# Patient Record
Sex: FEMALE | Race: BLACK OR AFRICAN AMERICAN | Employment: UNEMPLOYED | URBAN - METROPOLITAN AREA
[De-identification: names, ages, dates, MRNs, and addresses within clinical notes are randomized per-mention and may not be internally consistent; named-entity substitution may affect disease eponyms.]

---

## 2017-08-03 ENCOUNTER — OFFICE VISIT (OUTPATIENT)
Dept: URGENT CARE | Facility: URGENT CARE | Age: 27
End: 2017-08-03
Payer: COMMERCIAL

## 2017-08-03 VITALS
TEMPERATURE: 97.7 F | OXYGEN SATURATION: 97 % | BODY MASS INDEX: 41.54 KG/M2 | WEIGHT: 242 LBS | SYSTOLIC BLOOD PRESSURE: 118 MMHG | HEART RATE: 93 BPM | DIASTOLIC BLOOD PRESSURE: 50 MMHG

## 2017-08-03 DIAGNOSIS — H66.001 ACUTE SUPPURATIVE OTITIS MEDIA OF RIGHT EAR WITHOUT SPONTANEOUS RUPTURE OF TYMPANIC MEMBRANE, RECURRENCE NOT SPECIFIED: Primary | ICD-10-CM

## 2017-08-03 PROCEDURE — 99203 OFFICE O/P NEW LOW 30 MIN: CPT | Performed by: PHYSICIAN ASSISTANT

## 2017-08-03 RX ORDER — AMOXICILLIN 875 MG
875 TABLET ORAL 2 TIMES DAILY
Qty: 20 TABLET | Refills: 0 | Status: SHIPPED | OUTPATIENT
Start: 2017-08-03 | End: 2017-08-13

## 2017-08-03 NOTE — NURSING NOTE
"Chief Complaint   Patient presents with     Cough     Pt c/o cough, and sinus problem for over one week.        Initial /50 (BP Location: Right arm, Patient Position: Chair, Cuff Size: Adult Large)  Pulse 93  Temp 97.7  F (36.5  C) (Oral)  Wt 242 lb (109.8 kg)  SpO2 97%  Breastfeeding? No  BMI 41.54 kg/m2 Estimated body mass index is 41.54 kg/(m^2) as calculated from the following:    Height as of 11/15/13: 5' 4\" (1.626 m).    Weight as of this encounter: 242 lb (109.8 kg).  Medication Reconciliation: complete     Saige Mehta CMA (AAMA)      "

## 2017-08-03 NOTE — MR AVS SNAPSHOT
"              After Visit Summary   8/3/2017    Oniel Preciado    MRN: [id number]           Patient Information     Date Of Birth          1990        Visit Information        Provider Department      8/3/2017 11:50 AM Marilou Chacon PA-C [facility]        Today's Diagnoses     Acute suppurative otitis media of right ear without spontaneous rupture of tympanic membrane, recurrence not specified    -  1       Follow-ups after your visit        Who to contact     If you have questions or need follow up information about today's clinic visit or your schedule please contact [facility] directly at [phone].  Normal or non-critical lab and imaging results will be communicated to you by MyChart, letter or phone within 4 business days after the clinic has received the results. If you do not hear from us within 7 days, please contact the clinic through MyChart or phone. If you have a critical or abnormal lab result, we will notify you by phone as soon as possible.  Submit refill requests through [employer] or call your pharmacy and they will forward the refill request to us. Please allow 3 business days for your refill to be completed.          Additional Information About Your Visit        MyChart Information     [employer] lets you send messages to your doctor, view your test results, renew your prescriptions, schedule appointments and more. To sign up, go to [email or web address] . Click on \"Log in\" on the left side of the screen, which will take you to the Welcome page. Then click on \"Sign up Now\" on the right side of the page.     You will be asked to enter the access code listed below, as well as some personal information. Please follow the directions to create your username and password.     Your access code is: [email or web address]  Expires: 2017 11:12 PM     Your access code will  in 90 days. If you need help or a new code, please call your [place] " clinic or [phone].        Care EveryWhere ID     This is your Care EveryWhere ID. This could be used by other organizations to access your [facility] medical records  [id number]        Your Vitals Were     Pulse Temperature Pulse Oximetry Breastfeeding? BMI (Body Mass Index)       93 97.7  F (36.5  C) (Oral) 97% No 41.54 kg/m2        Blood Pressure from Last 3 Encounters:   08/03/17 118/50   03/24/14 143/75   03/20/14 132/76    Weight from Last 3 Encounters:   08/03/17 242 lb (109.8 kg)   03/24/14 256 lb (116.1 kg)   03/20/14 249 lb (112.9 kg)              Today, you had the following     No orders found for display         Today's Medication Changes          These changes are accurate as of: 8/3/17 11:12 PM.  If you have any questions, ask your nurse or doctor.               Start taking these medicines.        Dose/Directions    amoxicillin 875 MG tablet   Commonly known as:  AMOXIL   Used for:  Acute suppurative otitis media of right ear without spontaneous rupture of tympanic membrane, recurrence not specified   Started by:  Marilou Chacon PA-C        Dose:  875 mg   Take 1 tablet (875 mg) by mouth 2 times daily for 10 days   Quantity:  20 tablet   Refills:  0            Where to get your medicines      These medications were sent to [facility] - [place], [place] - [address]  [address], [facility] [address]     Phone:  [phone]     amoxicillin 875 MG tablet                Primary Care Provider Office Phone # Fax #    [facility] [place]note 2[place] [phone] [address] [address]        Equal Access to Services     [facility]: Hadii aad jarett hadasho Soomaali, waaxda luqadaha, qaybta kaalmada adeegyada, cecile desai. So [facility] [phone].    ATENCIÓN: Si habla español, tiene a abdullahi disposición servicios gratuitos de asistencia lingüística. Llame al [phone].    We comply with  applicable federal civil rights laws and [place] laws. We do not discriminate on the basis of race, color, national origin, age, disability sex, sexual orientation or gender identity.            Thank you!     Thank you for choosing [facility]  for your care. Our goal is always to provide you with excellent care. Hearing back from our patients is one way we can continue to improve our services. Please take a few minutes to complete the written survey that you may receive in the mail after your visit with us. Thank you!             Your Updated Medication List - Protect others around you: Learn how to safely use, store and throw away your medicines at www.disposemymeds.org.          This list is accurate as of: 8/3/17 11:12 PM.  Always use your most recent med list.                   Brand Name Dispense Instructions for use Diagnosis    amoxicillin 875 MG tablet    AMOXIL    20 tablet    Take 1 tablet (875 mg) by mouth 2 times daily for 10 days    Acute suppurative otitis media of right ear without spontaneous rupture of tympanic membrane, recurrence not specified       ibuprofen 600 MG tablet    ADVIL/MOTRIN    30 tablet    Take 1 tablet (600 mg) by mouth every 6 hours as needed for moderate pain    Acute post-operative pain

## 2017-08-03 NOTE — PROGRESS NOTES
SUBJECTIVE:                                                    Oneil Preciado is a 27 year old female who presents to clinic today for the following health issues:    RESPIRATORY SYMPTOMS      Duration: over one week    Description  Cough, congestion, ear pressure/problem, pressure in chest, headaches at the temples    Severity: moderate    Accompanying signs and symptoms: None    History (predisposing factors):  none    Precipitating or alleviating factors: None    Therapies tried and outcome:  rest and fluids, ibuprofen- no relief.     This started quickly with a cough, runny nose, wheezing  Now her ears feel plugged  Not sure about a fever  Breathing is ok  No chest pain  Loss of smell and taste    Sore Throat: YES but now improved  Eye discharge/redness:  no  Ear Pain: no  Headache:no - but pressure  Nausea, vomiting, or diarrhea:no  Abdominal pain: no  Sick contacts: Family member (children are coughing too);  Strep exposure: None;    History of seasonal/environmental allergies: yes  History of asthma: yes  History of COPD: no    She has an albuterol inhaler but has not used it in the past week.       ROS:  As in HPI      PROBLEM LIST:Patient Active Problem List    Diagnosis Date Noted     Anemia complicating pregnancy, childbirth, or the puerperium(648.2) 04/18/2014     Priority: Medium     Hgb 9.9 at [facility] pp.       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 Provider to review and confirm.       Asthma, currently inactive      Priority: Medium     Tobacco use disorder      Priority: Medium      MEDICATIONS:  Current Outpatient Prescriptions   Medication Sig Dispense Refill     ibuprofen (ADVIL,MOTRIN) 600 MG tablet Take 1 tablet (600 mg) by mouth every 6 hours as needed for moderate pain 30 tablet 0       ALLERGIES:  No Known Allergies    Problem list and histories reviewed & adjusted, as indicated.    OBJECTIVE:  /50 (BP Location: Right arm, Patient Position: Chair, Cuff Size: Adult Large)  Pulse 93  Temp 97.7  F (36.5  C) (Oral)  Wt 242 lb (109.8 kg)  SpO2 97%  Breastfeeding? No  BMI 41.54 kg/m2     GENERAL APPEARANCE: healthy, alert and no distress  EYES: EOMI,  PERRL, conjunctiva clear  HENT: TM erythematous right, TM congested/bulging right and TM fluid bilateral (right is purulent, left is clear)   NECK: supple, nontender, no lymphadenopathy  RESP: no rales or rhonchi and expiratory wheezes throughout  CV: regular rates and rhythm, normal S1 S2, no murmur noted  NEURO: Normal strength and tone, sensory exam grossly normal,  normal speech and mentation  SKIN: no suspicious lesions or rashes    DIAGNOSTICS: None      ASSESSMENT/PLAN:    1. Acute suppurative otitis media of right ear without spontaneous rupture of tympanic membrane, recurrence not specified  -Take ibuprofen and acetaminophen (Tylenol) as needed for pain.   - amoxicillin (AMOXIL) 875 MG tablet; Take 1 tablet (875 mg) by mouth 2 times daily for 10 days  Dispense: 20 tablet; Refill: 0    Her breathing, o2 sats, temperature, HR, RR are all normal today, so I do not feel we need to get a chest XR to evaluate for pnuemonia  If she her breathing is getting worse, develops fever, develops productive cough,   For now we will treat the otitis media and she will use her albuterol inhaler as needed      Marilou Chacon PA-C